# Patient Record
Sex: MALE | Race: WHITE | NOT HISPANIC OR LATINO | URBAN - METROPOLITAN AREA
[De-identification: names, ages, dates, MRNs, and addresses within clinical notes are randomized per-mention and may not be internally consistent; named-entity substitution may affect disease eponyms.]

---

## 2023-07-21 ENCOUNTER — APPOINTMENT (OUTPATIENT)
Dept: RADIOLOGY | Facility: MEDICAL CENTER | Age: 28
End: 2023-07-21
Attending: EMERGENCY MEDICINE
Payer: COMMERCIAL

## 2023-07-21 ENCOUNTER — HOSPITAL ENCOUNTER (OUTPATIENT)
Facility: MEDICAL CENTER | Age: 28
End: 2023-07-22
Attending: EMERGENCY MEDICINE | Admitting: HOSPITALIST
Payer: COMMERCIAL

## 2023-07-21 DIAGNOSIS — R07.9 CHEST PAIN, UNSPECIFIED TYPE: ICD-10-CM

## 2023-07-21 DIAGNOSIS — K40.91 UNILATERAL RECURRENT INGUINAL HERNIA WITHOUT OBSTRUCTION OR GANGRENE: ICD-10-CM

## 2023-07-21 DIAGNOSIS — R51.9 ACUTE NONINTRACTABLE HEADACHE, UNSPECIFIED HEADACHE TYPE: ICD-10-CM

## 2023-07-21 PROBLEM — E87.6 HYPOKALEMIA: Status: ACTIVE | Noted: 2023-07-21

## 2023-07-21 PROBLEM — R06.02 SHORTNESS OF BREATH: Status: ACTIVE | Noted: 2023-07-21

## 2023-07-21 PROBLEM — N50.819 TESTICULAR PAIN: Status: ACTIVE | Noted: 2023-07-21

## 2023-07-21 LAB
ALBUMIN SERPL BCP-MCNC: 4.8 G/DL (ref 3.2–4.9)
ALBUMIN/GLOB SERPL: 1.9 G/DL
ALP SERPL-CCNC: 77 U/L (ref 30–99)
ALT SERPL-CCNC: 15 U/L (ref 2–50)
AMPHET UR QL SCN: NEGATIVE
ANION GAP SERPL CALC-SCNC: 15 MMOL/L (ref 7–16)
AST SERPL-CCNC: 17 U/L (ref 12–45)
BARBITURATES UR QL SCN: NEGATIVE
BASOPHILS # BLD AUTO: 0.4 % (ref 0–1.8)
BASOPHILS # BLD: 0.03 K/UL (ref 0–0.12)
BENZODIAZ UR QL SCN: NEGATIVE
BILIRUB SERPL-MCNC: 1 MG/DL (ref 0.1–1.5)
BUN SERPL-MCNC: 10 MG/DL (ref 8–22)
BZE UR QL SCN: NEGATIVE
CALCIUM ALBUM COR SERPL-MCNC: 9.3 MG/DL (ref 8.5–10.5)
CALCIUM SERPL-MCNC: 9.9 MG/DL (ref 8.4–10.2)
CANNABINOIDS UR QL SCN: POSITIVE
CHLORIDE SERPL-SCNC: 107 MMOL/L (ref 96–112)
CO2 SERPL-SCNC: 19 MMOL/L (ref 20–33)
CREAT SERPL-MCNC: 1.08 MG/DL (ref 0.5–1.4)
D DIMER PPP IA.FEU-MCNC: <0.27 UG/ML (FEU) (ref 0–0.5)
EKG IMPRESSION: NORMAL
EOSINOPHIL # BLD AUTO: 0.07 K/UL (ref 0–0.51)
EOSINOPHIL NFR BLD: 0.9 % (ref 0–6.9)
ERYTHROCYTE [DISTWIDTH] IN BLOOD BY AUTOMATED COUNT: 38.6 FL (ref 35.9–50)
FENTANYL UR QL: NEGATIVE
GFR SERPLBLD CREATININE-BSD FMLA CKD-EPI: 96 ML/MIN/1.73 M 2
GLOBULIN SER CALC-MCNC: 2.5 G/DL (ref 1.9–3.5)
GLUCOSE SERPL-MCNC: 101 MG/DL (ref 65–99)
HCT VFR BLD AUTO: 43.7 % (ref 42–52)
HGB BLD-MCNC: 15.4 G/DL (ref 14–18)
IMM GRANULOCYTES # BLD AUTO: 0.02 K/UL (ref 0–0.11)
IMM GRANULOCYTES NFR BLD AUTO: 0.3 % (ref 0–0.9)
LYMPHOCYTES # BLD AUTO: 1.56 K/UL (ref 1–4.8)
LYMPHOCYTES NFR BLD: 20.7 % (ref 22–41)
MAGNESIUM SERPL-MCNC: 1.8 MG/DL (ref 1.5–2.5)
MCH RBC QN AUTO: 31.4 PG (ref 27–33)
MCHC RBC AUTO-ENTMCNC: 35.2 G/DL (ref 32.3–36.5)
MCV RBC AUTO: 89.2 FL (ref 81.4–97.8)
METHADONE UR QL SCN: NEGATIVE
MONOCYTES # BLD AUTO: 0.46 K/UL (ref 0–0.85)
MONOCYTES NFR BLD AUTO: 6.1 % (ref 0–13.4)
NEUTROPHILS # BLD AUTO: 5.38 K/UL (ref 1.82–7.42)
NEUTROPHILS NFR BLD: 71.6 % (ref 44–72)
NRBC # BLD AUTO: 0 K/UL
NRBC BLD-RTO: 0 /100 WBC (ref 0–0.2)
OPIATES UR QL SCN: NEGATIVE
OXYCODONE UR QL SCN: NEGATIVE
PCP UR QL SCN: NEGATIVE
PLATELET # BLD AUTO: 226 K/UL (ref 164–446)
PMV BLD AUTO: 10.3 FL (ref 9–12.9)
POTASSIUM SERPL-SCNC: 3.2 MMOL/L (ref 3.6–5.5)
PROPOXYPH UR QL SCN: NEGATIVE
PROT SERPL-MCNC: 7.3 G/DL (ref 6–8.2)
RBC # BLD AUTO: 4.9 M/UL (ref 4.7–6.1)
SODIUM SERPL-SCNC: 141 MMOL/L (ref 135–145)
T4 FREE SERPL-MCNC: 1.78 NG/DL (ref 0.93–1.7)
TROPONIN T SERPL-MCNC: <6 NG/L (ref 6–19)
TROPONIN T SERPL-MCNC: <6 NG/L (ref 6–19)
TSH SERPL DL<=0.005 MIU/L-ACNC: 1.24 UIU/ML (ref 0.38–5.33)
WBC # BLD AUTO: 7.5 K/UL (ref 4.8–10.8)

## 2023-07-21 PROCEDURE — 700117 HCHG RX CONTRAST REV CODE 255: Performed by: EMERGENCY MEDICINE

## 2023-07-21 PROCEDURE — 86658 ENTEROVIRUS ANTIBODY: CPT

## 2023-07-21 PROCEDURE — 84439 ASSAY OF FREE THYROXINE: CPT

## 2023-07-21 PROCEDURE — 80053 COMPREHEN METABOLIC PANEL: CPT

## 2023-07-21 PROCEDURE — 99223 1ST HOSP IP/OBS HIGH 75: CPT | Performed by: HOSPITALIST

## 2023-07-21 PROCEDURE — 83735 ASSAY OF MAGNESIUM: CPT

## 2023-07-21 PROCEDURE — G0378 HOSPITAL OBSERVATION PER HR: HCPCS

## 2023-07-21 PROCEDURE — 70450 CT HEAD/BRAIN W/O DYE: CPT

## 2023-07-21 PROCEDURE — 96374 THER/PROPH/DIAG INJ IV PUSH: CPT

## 2023-07-21 PROCEDURE — 84484 ASSAY OF TROPONIN QUANT: CPT | Mod: 91

## 2023-07-21 PROCEDURE — 99285 EMERGENCY DEPT VISIT HI MDM: CPT

## 2023-07-21 PROCEDURE — 71275 CT ANGIOGRAPHY CHEST: CPT

## 2023-07-21 PROCEDURE — 87581 M.PNEUMON DNA AMP PROBE: CPT

## 2023-07-21 PROCEDURE — 84443 ASSAY THYROID STIM HORMONE: CPT

## 2023-07-21 PROCEDURE — 80307 DRUG TEST PRSMV CHEM ANLYZR: CPT

## 2023-07-21 PROCEDURE — 700102 HCHG RX REV CODE 250 W/ 637 OVERRIDE(OP): Performed by: EMERGENCY MEDICINE

## 2023-07-21 PROCEDURE — A9270 NON-COVERED ITEM OR SERVICE: HCPCS | Performed by: EMERGENCY MEDICINE

## 2023-07-21 PROCEDURE — 93005 ELECTROCARDIOGRAM TRACING: CPT | Performed by: EMERGENCY MEDICINE

## 2023-07-21 PROCEDURE — 700105 HCHG RX REV CODE 258: Mod: JZ | Performed by: HOSPITALIST

## 2023-07-21 PROCEDURE — 36415 COLL VENOUS BLD VENIPUNCTURE: CPT

## 2023-07-21 PROCEDURE — 87798 DETECT AGENT NOS DNA AMP: CPT | Mod: 91

## 2023-07-21 PROCEDURE — 93005 ELECTROCARDIOGRAM TRACING: CPT

## 2023-07-21 PROCEDURE — 71045 X-RAY EXAM CHEST 1 VIEW: CPT

## 2023-07-21 PROCEDURE — 700102 HCHG RX REV CODE 250 W/ 637 OVERRIDE(OP): Performed by: HOSPITALIST

## 2023-07-21 PROCEDURE — 85379 FIBRIN DEGRADATION QUANT: CPT

## 2023-07-21 PROCEDURE — 87633 RESP VIRUS 12-25 TARGETS: CPT

## 2023-07-21 PROCEDURE — 85025 COMPLETE CBC W/AUTO DIFF WBC: CPT

## 2023-07-21 PROCEDURE — A9270 NON-COVERED ITEM OR SERVICE: HCPCS | Performed by: HOSPITALIST

## 2023-07-21 PROCEDURE — 87486 CHLMYD PNEUM DNA AMP PROBE: CPT

## 2023-07-21 PROCEDURE — 700111 HCHG RX REV CODE 636 W/ 250 OVERRIDE (IP): Mod: JZ | Performed by: EMERGENCY MEDICINE

## 2023-07-21 PROCEDURE — 76870 US EXAM SCROTUM: CPT

## 2023-07-21 PROCEDURE — 94760 N-INVAS EAR/PLS OXIMETRY 1: CPT

## 2023-07-21 RX ORDER — ACETAMINOPHEN 325 MG/1
650 TABLET ORAL EVERY 6 HOURS PRN
Status: DISCONTINUED | OUTPATIENT
Start: 2023-07-21 | End: 2023-07-22 | Stop reason: HOSPADM

## 2023-07-21 RX ORDER — KETOROLAC TROMETHAMINE 30 MG/ML
15 INJECTION, SOLUTION INTRAMUSCULAR; INTRAVENOUS ONCE
Status: DISCONTINUED | OUTPATIENT
Start: 2023-07-21 | End: 2023-07-22 | Stop reason: HOSPADM

## 2023-07-21 RX ORDER — SODIUM CHLORIDE, SODIUM LACTATE, POTASSIUM CHLORIDE, CALCIUM CHLORIDE 600; 310; 30; 20 MG/100ML; MG/100ML; MG/100ML; MG/100ML
INJECTION, SOLUTION INTRAVENOUS CONTINUOUS
Status: DISCONTINUED | OUTPATIENT
Start: 2023-07-21 | End: 2023-07-22 | Stop reason: HOSPADM

## 2023-07-21 RX ORDER — ONDANSETRON 4 MG/1
4 TABLET, ORALLY DISINTEGRATING ORAL EVERY 4 HOURS PRN
Status: DISCONTINUED | OUTPATIENT
Start: 2023-07-21 | End: 2023-07-22 | Stop reason: HOSPADM

## 2023-07-21 RX ORDER — PROMETHAZINE HYDROCHLORIDE 25 MG/1
12.5-25 TABLET ORAL EVERY 4 HOURS PRN
Status: DISCONTINUED | OUTPATIENT
Start: 2023-07-21 | End: 2023-07-22 | Stop reason: HOSPADM

## 2023-07-21 RX ORDER — OXYCODONE HYDROCHLORIDE 10 MG/1
10 TABLET ORAL
Status: DISCONTINUED | OUTPATIENT
Start: 2023-07-21 | End: 2023-07-22 | Stop reason: HOSPADM

## 2023-07-21 RX ORDER — ONDANSETRON 2 MG/ML
4 INJECTION INTRAMUSCULAR; INTRAVENOUS EVERY 4 HOURS PRN
Status: DISCONTINUED | OUTPATIENT
Start: 2023-07-21 | End: 2023-07-22 | Stop reason: HOSPADM

## 2023-07-21 RX ORDER — HYDROMORPHONE HYDROCHLORIDE 1 MG/ML
0.5 INJECTION, SOLUTION INTRAMUSCULAR; INTRAVENOUS; SUBCUTANEOUS
Status: DISCONTINUED | OUTPATIENT
Start: 2023-07-21 | End: 2023-07-22 | Stop reason: HOSPADM

## 2023-07-21 RX ORDER — ONDANSETRON 2 MG/ML
4 INJECTION INTRAMUSCULAR; INTRAVENOUS ONCE
Status: COMPLETED | OUTPATIENT
Start: 2023-07-21 | End: 2023-07-21

## 2023-07-21 RX ORDER — AMOXICILLIN 250 MG
2 CAPSULE ORAL 2 TIMES DAILY
Status: DISCONTINUED | OUTPATIENT
Start: 2023-07-21 | End: 2023-07-22 | Stop reason: HOSPADM

## 2023-07-21 RX ORDER — ACETAMINOPHEN 325 MG/1
650 TABLET ORAL ONCE
Status: COMPLETED | OUTPATIENT
Start: 2023-07-21 | End: 2023-07-21

## 2023-07-21 RX ORDER — POLYETHYLENE GLYCOL 3350 17 G/17G
1 POWDER, FOR SOLUTION ORAL
Status: DISCONTINUED | OUTPATIENT
Start: 2023-07-21 | End: 2023-07-22 | Stop reason: HOSPADM

## 2023-07-21 RX ORDER — PROCHLORPERAZINE EDISYLATE 5 MG/ML
5-10 INJECTION INTRAMUSCULAR; INTRAVENOUS EVERY 4 HOURS PRN
Status: DISCONTINUED | OUTPATIENT
Start: 2023-07-21 | End: 2023-07-22 | Stop reason: HOSPADM

## 2023-07-21 RX ORDER — BISACODYL 10 MG
10 SUPPOSITORY, RECTAL RECTAL
Status: DISCONTINUED | OUTPATIENT
Start: 2023-07-21 | End: 2023-07-22 | Stop reason: HOSPADM

## 2023-07-21 RX ORDER — PROMETHAZINE HYDROCHLORIDE 25 MG/1
12.5-25 SUPPOSITORY RECTAL EVERY 4 HOURS PRN
Status: DISCONTINUED | OUTPATIENT
Start: 2023-07-21 | End: 2023-07-22 | Stop reason: HOSPADM

## 2023-07-21 RX ORDER — OXYCODONE HYDROCHLORIDE 5 MG/1
5 TABLET ORAL
Status: DISCONTINUED | OUTPATIENT
Start: 2023-07-21 | End: 2023-07-22 | Stop reason: HOSPADM

## 2023-07-21 RX ADMIN — IOHEXOL 100 ML: 350 INJECTION, SOLUTION INTRAVENOUS at 18:00

## 2023-07-21 RX ADMIN — SODIUM CHLORIDE, POTASSIUM CHLORIDE, SODIUM LACTATE AND CALCIUM CHLORIDE: 600; 310; 30; 20 INJECTION, SOLUTION INTRAVENOUS at 21:21

## 2023-07-21 RX ADMIN — RIVAROXABAN 10 MG: 10 TABLET, FILM COATED ORAL at 21:04

## 2023-07-21 RX ADMIN — ACETAMINOPHEN 650 MG: 325 TABLET ORAL at 16:59

## 2023-07-21 RX ADMIN — OXYCODONE 5 MG: 5 TABLET ORAL at 22:04

## 2023-07-21 RX ADMIN — ONDANSETRON 4 MG: 2 INJECTION INTRAMUSCULAR; INTRAVENOUS at 17:05

## 2023-07-21 ASSESSMENT — COGNITIVE AND FUNCTIONAL STATUS - GENERAL
SUGGESTED CMS G CODE MODIFIER DAILY ACTIVITY: CH
MOBILITY SCORE: 24
DAILY ACTIVITIY SCORE: 24
SUGGESTED CMS G CODE MODIFIER MOBILITY: CH

## 2023-07-21 ASSESSMENT — HEART SCORE
HEART SCORE: 3
RISK FACTORS: 1-2 RISK FACTORS
HISTORY: MODERATELY SUSPICIOUS
AGE: <45
TROPONIN: LESS THAN OR EQUAL TO NORMAL LIMIT
ECG: NON-SPECIFIC REPOLARIZATION DISTURBANCE

## 2023-07-21 ASSESSMENT — LIFESTYLE VARIABLES
EVER HAD A DRINK FIRST THING IN THE MORNING TO STEADY YOUR NERVES TO GET RID OF A HANGOVER: NO
TOTAL SCORE: 0
HOW MANY TIMES IN THE PAST YEAR HAVE YOU HAD 5 OR MORE DRINKS IN A DAY: 0
HAVE PEOPLE ANNOYED YOU BY CRITICIZING YOUR DRINKING: NO
AVERAGE NUMBER OF DAYS PER WEEK YOU HAVE A DRINK CONTAINING ALCOHOL: 2
TOTAL SCORE: 0
ALCOHOL_USE: YES
TOTAL SCORE: 0
CONSUMPTION TOTAL: NEGATIVE
EVER FELT BAD OR GUILTY ABOUT YOUR DRINKING: NO
HAVE YOU EVER FELT YOU SHOULD CUT DOWN ON YOUR DRINKING: NO
ON A TYPICAL DAY WHEN YOU DRINK ALCOHOL HOW MANY DRINKS DO YOU HAVE: 2

## 2023-07-21 ASSESSMENT — ENCOUNTER SYMPTOMS
FOCAL WEAKNESS: 0
FEVER: 0
STRIDOR: 0
SHORTNESS OF BREATH: 1
NERVOUS/ANXIOUS: 0
BRUISES/BLEEDS EASILY: 0
ABDOMINAL PAIN: 0
EYE DISCHARGE: 0
MYALGIAS: 0
FLANK PAIN: 0
EYE REDNESS: 0
COUGH: 0
VOMITING: 0
CHILLS: 1

## 2023-07-21 ASSESSMENT — PATIENT HEALTH QUESTIONNAIRE - PHQ9
1. LITTLE INTEREST OR PLEASURE IN DOING THINGS: NOT AT ALL
2. FEELING DOWN, DEPRESSED, IRRITABLE, OR HOPELESS: NOT AT ALL
SUM OF ALL RESPONSES TO PHQ9 QUESTIONS 1 AND 2: 0

## 2023-07-21 ASSESSMENT — PAIN DESCRIPTION - PAIN TYPE
TYPE: ACUTE PAIN
TYPE: ACUTE PAIN

## 2023-07-21 ASSESSMENT — FIBROSIS 4 INDEX: FIB4 SCORE: 0.52

## 2023-07-21 NOTE — ED PROVIDER NOTES
"ED Provider Note    CHIEF COMPLAINT  Chief Complaint   Patient presents with    Chest Pain     Arrives anxious, hyperventilating and complaining of chest pain. With a friend who states this pt had a recent DX of a hernia. Taken for an EKG in triage.        EXTERNAL RECORDS REVIEWED  Other no pertinent records for review    HPI/ROS  LIMITATION TO HISTORY   Select: : None  OUTSIDE HISTORIAN(S):  Friend at bedside but does not provide any additional history    Amilcar Grove Jr. is a 28 y.o. male who presents to the ER with complaint of an exacerbation of chest pain, headache and testicle pain which has been going on for the last 2 weeks.  Patient states that he has had testicle discomfort for the last month and a half.  2 weeks ago he developed increased testicle pain in conjunction with chest pain and a headache and went to the ER in [place] and was diagnosed with a right inguinal hernia.  He states that over the last 2 weeks he had multiple episodes of an increase in testicle pain, chest pain and headache which seems to last anywhere from 5 to 10 minutes at a time.  Symptoms will then resolve for about 8 or 9 hours and then recur.  Patient feels short of breath when these episodes occur.  The patient said that he got in the car in [place] around 9:30 in the morning.  He ate a marijuana edible.  Around 10:30 in the morning on their drive towards [place], he had a sudden increase in chest pain, headache and testicle pain which was much more severe than any other exacerbation that he has had over the last few weeks.  Patient states \"I felt like I was going to die.\"  Patient said he felt very short of breath with this.  The pain radiated into his bilateral elbows and knees.  He said that the pain also radiated into his right jaw and into his back.  Here in the ER the patient says he feels a little better although he still has a mild headache and still feels an ache in his chest.  He describes the chest pain " "as \"a balloon inflating within his chest.\"  Patient is not on any medications for any medical problems.  With respect to the testicle pain, he says the pain in his testicles the same pain he has had for the last month and a half.  Nothing new or different.  It is the chest pain and headache that was quite a bit worse than normal today and it is the chest pain and headache which brought him to the ER this afternoon.    PAST MEDICAL HISTORY   has a past medical history of Biliary dyskinesia.    SURGICAL HISTORY   has a past surgical history that includes tonsillectomy (11/2/2011) and connie by laparoscopy (12/17/2012).    FAMILY HISTORY  No family history on file.    SOCIAL HISTORY  Social History     Tobacco Use    Smoking status: Never    Smokeless tobacco: Not on file   Substance and Sexual Activity    Alcohol use: No    Drug use: No    Sexual activity: Not on file       CURRENT MEDICATIONS  Home Medications       Reviewed by Kalpesh Ibarra (Pharmacy Tech) on 07/21/23 at 1702  Med List Status: Complete     Medication Last Dose Status   ASHWAGANDHA PO 7/20/2023 Active   Cholecalciferol (VITAMIN D-3) 125 MCG (5000 UT) Tab 7/21/2023 Active   Ginkgo Biloba (GINKGO PO) 7/20/2023 Active   Menaquinone-7 (VITAMIN K2 PO) 7/20/2023 Active   Non Formulary Request 7/21/2023 Active   Omega-3 Fatty Acids (FISH OIL PO) 7/20/2023 Active                    ALLERGIES  No Known Allergies    PHYSICAL EXAM  VITAL SIGNS: /65   Pulse 62   Temp 36.8 °C (98.3 °F) (Temporal)   Resp 15   Ht 1.854 m (6' 1\")   Wt 81.6 kg (180 lb)   SpO2 95%   BMI 23.75 kg/m²    Constitutional:  Well developed, well nourished; anxious.  Slightly pale in color.  HENT: Normocephalic, Atraumatic, Bilateral external ears normal, oropharyngeal examination deferred due to COVID-19 break and lack of oropharyngeal complaint.  Eyes: PERRL, EOMI, Conjunctiva normal, No discharge.   Neck: Normal range of motion, supple, nontender  Lymphatic: No " lymphadenopathy noted.   Cardiovascular: Normal heart rate, Normal rhythm, No murmurs, rubs or gallops   Thorax & Lungs: CTA=bilaterally;  No respiratory distress,  No wheezing rales, or rhonchi; No chest tenderness. No crepitus or subQ air  Abdomen: Soft, good bowel sounds,  No guarding no rebound, no masses, no pulsatile mass, no tenderness, no distention  : Mild testicle tenderness on the left.  No obvious large hernias on examination of the inguinal canal.  Testicles are nontender.  No scrotal thickening or redness.  Skin: Warm, Dry, No erythema, No rash.   Back: No tenderness, No CVA tenderness.   Extremities: 2+ dp and pt pulses bilateral LEs;  Nontender; no pretibial edema  Neurologic: Alert & oriented x 4, clear speech  Psychiatric: appropriate, normal affect     DIAGNOSTIC STUDIES / PROCEDURES  EKG  I have independently interpreted this EKG  Please see my EKG interpretation below    LABS  Results for orders placed or performed during the hospital encounter of 07/21/23   CBC WITH DIFFERENTIAL   Result Value Ref Range    WBC 7.5 4.8 - 10.8 K/uL    RBC 4.90 4.70 - 6.10 M/uL    Hemoglobin 15.4 14.0 - 18.0 g/dL    Hematocrit 43.7 42.0 - 52.0 %    MCV 89.2 81.4 - 97.8 fL    MCH 31.4 27.0 - 33.0 pg    MCHC 35.2 32.3 - 36.5 g/dL    RDW 38.6 35.9 - 50.0 fL    Platelet Count 226 164 - 446 K/uL    MPV 10.3 9.0 - 12.9 fL    Neutrophils-Polys 71.60 44.00 - 72.00 %    Lymphocytes 20.70 (L) 22.00 - 41.00 %    Monocytes 6.10 0.00 - 13.40 %    Eosinophils 0.90 0.00 - 6.90 %    Basophils 0.40 0.00 - 1.80 %    Immature Granulocytes 0.30 0.00 - 0.90 %    Nucleated RBC 0.00 0.00 - 0.20 /100 WBC    Neutrophils (Absolute) 5.38 1.82 - 7.42 K/uL    Lymphs (Absolute) 1.56 1.00 - 4.80 K/uL    Monos (Absolute) 0.46 0.00 - 0.85 K/uL    Eos (Absolute) 0.07 0.00 - 0.51 K/uL    Baso (Absolute) 0.03 0.00 - 0.12 K/uL    Immature Granulocytes (abs) 0.02 0.00 - 0.11 K/uL    NRBC (Absolute) 0.00 K/uL   TROPONIN   Result Value Ref Range     Troponin T <6 6 - 19 ng/L   Comp Metabolic Panel   Result Value Ref Range    Sodium 141 135 - 145 mmol/L    Potassium 3.2 (L) 3.6 - 5.5 mmol/L    Chloride 107 96 - 112 mmol/L    Co2 19 (L) 20 - 33 mmol/L    Anion Gap 15.0 7.0 - 16.0    Glucose 101 (H) 65 - 99 mg/dL    Bun 10 8 - 22 mg/dL    Creatinine 1.08 0.50 - 1.40 mg/dL    Calcium 9.9 8.4 - 10.2 mg/dL    Correct Calcium 9.3 8.5 - 10.5 mg/dL    AST(SGOT) 17 12 - 45 U/L    ALT(SGPT) 15 2 - 50 U/L    Alkaline Phosphatase 77 30 - 99 U/L    Total Bilirubin 1.0 0.1 - 1.5 mg/dL    Albumin 4.8 3.2 - 4.9 g/dL    Total Protein 7.3 6.0 - 8.2 g/dL    Globulin 2.5 1.9 - 3.5 g/dL    A-G Ratio 1.9 g/dL   ESTIMATED GFR   Result Value Ref Range    GFR (CKD-EPI) 96 >60 mL/min/1.73 m 2   TSH   Result Value Ref Range    TSH 1.240 0.380 - 5.330 uIU/mL   D-DIMER   Result Value Ref Range    D-Dimer <0.27 0.00 - 0.50 ug/mL (FEU)   URINE DRUG SCREEN   Result Value Ref Range    Amphetamines Urine Negative Negative    Barbiturates Negative Negative    Benzodiazepines Negative Negative    Cocaine Metabolite Negative Negative    Fentanyl, Urine Negative Negative    Methadone Negative Negative    Opiates Negative Negative    Oxycodone Negative Negative    Phencyclidine -Pcp Negative Negative    Propoxyphene Negative Negative    Cannabinoid Metab Positive (A) Negative   FREE THYROXINE   Result Value Ref Range    Free T-4 1.78 (H) 0.93 - 1.70 ng/dL   MAGNESIUM   Result Value Ref Range    Magnesium 1.8 1.5 - 2.5 mg/dL   EKG   Result Value Ref Range    Report       [facility] Emergency Dept.    Test Date:  2023  Pt Name:    TORSTEN ABBOTT               Department: [facility]  MRN:        [id number]                      Room:  Gender:     Male                         Technician: [id number] MICHELLE  :        1995                   Requested By:ER TRIAGE PROTOCOL  Order #:    [id number]                    Rodney MD: Gauri [place]    Measurements  Intervals                                 Axis  Rate:       110                          P:          72  KY:         150                          QRS:        104  QRSD:       113                          T:          49  QT:         339  QTc:        459    Interpretive Statements  Sinus tachycardia rate 110  Normal axis  Minimal ST depression II, III, AVF  No ST elevation  Borderline intraventricular conduction delay  Baseline wander in lead(s) V1,V2  No previous ECG available for comparison  Electronically Signed On 07- 14:40:39 PDT by Gauri [place]          RADIOLOGY  I have independently interpreted the diagnostic imaging associated with this visit and am waiting the final reading from the radiologist.     My preliminary interpretation is as follows: Chest x-ray was reviewed by ER MD.  No mediastinal widening.  No obvious infiltrates.    Radiologist interpretation:   CT-CTA COMPLETE THORACOABDOMINAL AORTA   Final Result      1.  No aortic aneurysm or dissection identified.   2.  No acute abnormality of the chest, abdomen or pelvis.   3.  Status post cholecystectomy.   4.  Evidence of previous left varicocele embolization.                        CT-HEAD W/O   Final Result      No CT evidence of acute infarct, hemorrhage or mass.         [id number]   Final Result      1.  No evidence of testicular mass or torsion.      2.  Small right inguinal hernia.      DX-CHEST-PORTABLE (1 VIEW)   Final Result      No evidence of acute cardiopulmonary process.           COURSE & MEDICAL DECISION MAKING    ED Observation Status? Yes; I am placing the patient in to an observation status due to a diagnostic uncertainty as well as therapeutic intensity. Patient placed in observation status at 3:18 PM, 7/21/2023.     Observation plan is as follows: Patient was placed in ED observation status as he will need comprehensive work-up and evaluation for his complaint of headache, chest pain and testicle pain.    Upon Reevaluation, the patient's condition has:  "not improved; and will be escalated to hospitalization.    Patient discharged from ED Observation status at 1850 (Time) July 21, 2023  (Date).     INITIAL ASSESSMENT, COURSE AND PLAN  Care Narrative: Patient presents to the ER complaining of chest pain with radiation into the back, pain to the right jaw, and bilateral elbows, down in the bilateral knees as well as a headache and testicular pain which became markedly more severe around 1030 this morning while he was driving west from [place].  Patient and his significant other were heading towards [place].  The patient says of last 2 weeks he had intermittent episodes of chest pain, headache and shortness of breath as well as testicle pain, although the testicle pain has been going on for about a month and a half and seems to be fairly stable.  He describes the chest pain as a feeling as though a balloon is expanding in his chest.  He gets short of breath.  When he presented here to the ER he was tachycardic in the 130s.  He was hyperventilating.  He told me that the pain was so bad today that he \"thought he was going to die.\"  EKG shows some mild ST depression in the inferior leads as well as some CT depression.  No ST elevation.  Troponin is normal.  Based upon EKG, I am concerned about pericarditis.  Dimer is normal.  No evidence of PE.  Given patient's complaint of chest pain rating to the back and the neck as well as bilateral elbows and his knees and the testicle pain, is worried about an aortic dissection.  For this reason a thoracoabdominal aortic CT scan was done.  It is negative for any acute dissection.  Patient has He is feeling better now here that he is here in the ER.  He was given some Toradol for what might be pericarditis.  At this time I think he needs to be hospitalized for echocardiogram already had his gallbladder taken out.  For further evaluation and rule out for pericarditis.  I spoke with the hospitalist on-call and he will kindly " evaluate the patient hospitalization.    1850: Discussed with Dr. Vidal, hospitalist on-call.  He will kindly evaluate the patient for hospitalization.    HTN/IDDM FOLLOW UP:  The patient is referred to a primary physician for blood pressure management, diabetic screening, and for all other preventive health concerns      ADDITIONAL PROBLEM LIST  Problem #1: Chest pain   problem #2: Headache  Problem #3: Testicle pain with known right inguinal hernia    DISPOSITION AND DISCUSSIONS  I have discussed management of the patient with the following physicians and JANETH's:      Discussion of management with other Q or appropriate source(s): None     Escalation of care considered, and ultimately not performed: Patient does not have any fever.  No stiff neck.  Headaches have been coming and going for 2 weeks.  No need for lumbar puncture.    Barriers to care at this time, including but not limited to:  Patient is from [place] .     Decision tools and prescription drugs considered including, but not limited to: HEART Score 3 .    FINAL DIAGNOSIS  1. Chest pain, unspecified type Acute   2. Acute nonintractable headache, unspecified headache type Acute   3. Unilateral recurrent inguinal hernia without obstruction or gangrene Acute        This dictation has been created using voice recognition software. The accuracy of the dictation is limited by the abilities of the software. I expect there may be some errors of grammar and possibly content. I made every attempt to manually correct the errors within my dictation. However, errors related to voice recognition software may still exist and should be interpreted within the appropriate context.     Electronically signed by: Gauri [place] M.D., 7/21/2023 2:21 PM

## 2023-07-22 ENCOUNTER — APPOINTMENT (OUTPATIENT)
Dept: CARDIOLOGY | Facility: MEDICAL CENTER | Age: 28
End: 2023-07-22
Attending: INTERNAL MEDICINE
Payer: COMMERCIAL

## 2023-07-22 VITALS
RESPIRATION RATE: 18 BRPM | TEMPERATURE: 98.4 F | HEART RATE: 78 BPM | WEIGHT: 182.1 LBS | BODY MASS INDEX: 24.13 KG/M2 | OXYGEN SATURATION: 99 % | HEIGHT: 73 IN | SYSTOLIC BLOOD PRESSURE: 115 MMHG | DIASTOLIC BLOOD PRESSURE: 76 MMHG

## 2023-07-22 LAB
ALBUMIN SERPL BCP-MCNC: 4.3 G/DL (ref 3.2–4.9)
ALBUMIN/GLOB SERPL: 1.9 G/DL
ALP SERPL-CCNC: 67 U/L (ref 30–99)
ALT SERPL-CCNC: 12 U/L (ref 2–50)
ANION GAP SERPL CALC-SCNC: 10 MMOL/L (ref 7–16)
AST SERPL-CCNC: 13 U/L (ref 12–45)
B PARAP IS1001 DNA NPH QL NAA+NON-PROBE: NOT DETECTED
B PERT.PT PRMT NPH QL NAA+NON-PROBE: NOT DETECTED
BILIRUB SERPL-MCNC: 0.9 MG/DL (ref 0.1–1.5)
BUN SERPL-MCNC: 9 MG/DL (ref 8–22)
C PNEUM DNA NPH QL NAA+NON-PROBE: NOT DETECTED
CALCIUM ALBUM COR SERPL-MCNC: 9 MG/DL (ref 8.5–10.5)
CALCIUM SERPL-MCNC: 9.2 MG/DL (ref 8.4–10.2)
CHLORIDE SERPL-SCNC: 107 MMOL/L (ref 96–112)
CHOLEST SERPL-MCNC: 109 MG/DL (ref 100–199)
CO2 SERPL-SCNC: 24 MMOL/L (ref 20–33)
CREAT SERPL-MCNC: 0.93 MG/DL (ref 0.5–1.4)
EKG IMPRESSION: NORMAL
ERYTHROCYTE [DISTWIDTH] IN BLOOD BY AUTOMATED COUNT: 42 FL (ref 35.9–50)
FLUAV RNA NPH QL NAA+NON-PROBE: NOT DETECTED
FLUBV RNA NPH QL NAA+NON-PROBE: NOT DETECTED
GFR SERPLBLD CREATININE-BSD FMLA CKD-EPI: 115 ML/MIN/1.73 M 2
GLOBULIN SER CALC-MCNC: 2.3 G/DL (ref 1.9–3.5)
GLUCOSE SERPL-MCNC: 86 MG/DL (ref 65–99)
HADV DNA NPH QL NAA+NON-PROBE: NOT DETECTED
HCOV 229E RNA NPH QL NAA+NON-PROBE: NOT DETECTED
HCOV HKU1 RNA NPH QL NAA+NON-PROBE: NOT DETECTED
HCOV NL63 RNA NPH QL NAA+NON-PROBE: NOT DETECTED
HCOV OC43 RNA NPH QL NAA+NON-PROBE: NOT DETECTED
HCT VFR BLD AUTO: 40.7 % (ref 42–52)
HDLC SERPL-MCNC: 49 MG/DL
HGB BLD-MCNC: 14.2 G/DL (ref 14–18)
HMPV RNA NPH QL NAA+NON-PROBE: NOT DETECTED
HPIV1 RNA NPH QL NAA+NON-PROBE: NOT DETECTED
HPIV2 RNA NPH QL NAA+NON-PROBE: NOT DETECTED
HPIV3 RNA NPH QL NAA+NON-PROBE: NOT DETECTED
HPIV4 RNA NPH QL NAA+NON-PROBE: NOT DETECTED
LDLC SERPL CALC-MCNC: 54 MG/DL
LV EJECT FRACT  99904: 65
LV EJECT FRACT MOD 2C 99903: 66.54
LV EJECT FRACT MOD 4C 99902: 67.28
LV EJECT FRACT MOD BP 99901: 67.28
M PNEUMO DNA NPH QL NAA+NON-PROBE: NOT DETECTED
MAGNESIUM SERPL-MCNC: 2.1 MG/DL (ref 1.5–2.5)
MCH RBC QN AUTO: 32.1 PG (ref 27–33)
MCHC RBC AUTO-ENTMCNC: 34.9 G/DL (ref 32.3–36.5)
MCV RBC AUTO: 91.9 FL (ref 81.4–97.8)
PLATELET # BLD AUTO: 204 K/UL (ref 164–446)
PMV BLD AUTO: 10.6 FL (ref 9–12.9)
POTASSIUM SERPL-SCNC: 4.1 MMOL/L (ref 3.6–5.5)
PROT SERPL-MCNC: 6.6 G/DL (ref 6–8.2)
RBC # BLD AUTO: 4.43 M/UL (ref 4.7–6.1)
RSV RNA NPH QL NAA+NON-PROBE: NOT DETECTED
RV+EV RNA NPH QL NAA+NON-PROBE: NOT DETECTED
SARS-COV-2 RNA NPH QL NAA+NON-PROBE: NOTDETECTED
SODIUM SERPL-SCNC: 141 MMOL/L (ref 135–145)
TRIGL SERPL-MCNC: 29 MG/DL (ref 0–149)
WBC # BLD AUTO: 6.5 K/UL (ref 4.8–10.8)

## 2023-07-22 PROCEDURE — 99239 HOSP IP/OBS DSCHRG MGMT >30: CPT | Performed by: INTERNAL MEDICINE

## 2023-07-22 PROCEDURE — 83735 ASSAY OF MAGNESIUM: CPT

## 2023-07-22 PROCEDURE — G0378 HOSPITAL OBSERVATION PER HR: HCPCS

## 2023-07-22 PROCEDURE — 93005 ELECTROCARDIOGRAM TRACING: CPT | Performed by: INTERNAL MEDICINE

## 2023-07-22 PROCEDURE — 36415 COLL VENOUS BLD VENIPUNCTURE: CPT

## 2023-07-22 PROCEDURE — A9270 NON-COVERED ITEM OR SERVICE: HCPCS | Performed by: HOSPITALIST

## 2023-07-22 PROCEDURE — 94760 N-INVAS EAR/PLS OXIMETRY 1: CPT

## 2023-07-22 PROCEDURE — 80061 LIPID PANEL: CPT

## 2023-07-22 PROCEDURE — 93306 TTE W/DOPPLER COMPLETE: CPT | Mod: 26 | Performed by: INTERNAL MEDICINE

## 2023-07-22 PROCEDURE — 93306 TTE W/DOPPLER COMPLETE: CPT

## 2023-07-22 PROCEDURE — 85027 COMPLETE CBC AUTOMATED: CPT

## 2023-07-22 PROCEDURE — 80053 COMPREHEN METABOLIC PANEL: CPT

## 2023-07-22 PROCEDURE — 93010 ELECTROCARDIOGRAM REPORT: CPT | Performed by: INTERNAL MEDICINE

## 2023-07-22 PROCEDURE — 700102 HCHG RX REV CODE 250 W/ 637 OVERRIDE(OP): Performed by: HOSPITALIST

## 2023-07-22 RX ADMIN — SENNOSIDES AND DOCUSATE SODIUM 2 TABLET: 50; 8.6 TABLET ORAL at 05:22

## 2023-07-22 ASSESSMENT — PATIENT HEALTH QUESTIONNAIRE - PHQ9
1. LITTLE INTEREST OR PLEASURE IN DOING THINGS: NOT AT ALL
SUM OF ALL RESPONSES TO PHQ9 QUESTIONS 1 AND 2: 0
2. FEELING DOWN, DEPRESSED, IRRITABLE, OR HOPELESS: NOT AT ALL

## 2023-07-22 ASSESSMENT — PAIN DESCRIPTION - PAIN TYPE: TYPE: ACUTE PAIN

## 2023-07-22 NOTE — PROGRESS NOTES
Telemetry Shift Summary     Rhythm: SR/SB  HR: 45-71  Ectopy: -  Measurements: 0.14/0.10/0.46       Normal Values  Rhythm: SR  HR:   Measurements: 0.12-0.20 / 0.04-0.10 / 0.30-0.52

## 2023-07-22 NOTE — ED NOTES
Pharmacy Medication Reconciliation      ~Medication reconciliation updated and complete per patient at bedside   ~Allergies have been verified   ~No oral ABX within the last 30 days  ~Patient home pharmacy :  [facility]

## 2023-07-22 NOTE — PROGRESS NOTES
4 Eyes Skin Assessment Completed by Radha RN and Crystal RN.    Head WDL  Ears WDL  Nose WDL  Mouth WDL  Neck WDL  Breast/Chest WDL  Shoulder Blades WDL  Spine WDL  (R) Arm/Elbow/Hand Scar  (L) Arm/Elbow/Hand WDL  Abdomen WDL  Groin WDL  Scrotum/Coccyx/Buttocks WDL  (R) Leg WDL  (L) Leg Scar  (R) Heel/Foot/Toe WDL  (L) Heel/Foot/Toe WDL          Devices In Places Tele Box      Interventions In Place N/A    Possible Skin Injury No    Pictures Uploaded Into Epic N/A  Wound Consult Placed N/A  RN Wound Prevention Protocol Ordered No

## 2023-07-22 NOTE — PROGRESS NOTES
On call provider notified of pt heart rate down to 43 and sustaining < 50. Provider would like to be notified if pt is symptomatic.

## 2023-07-22 NOTE — DISCHARGE INSTRUCTIONS
Chest Pain Observation  It is often hard to give a specific diagnosis for the cause of chest pain. Among other possibilities your symptoms might be caused by inadequate oxygen delivery to your heart (angina). Angina that is not treated or evaluated can lead to a heart attack (myocardial infarction) or death.  Blood tests, electrocardiograms, and X-rays may have been done to help determine a possible cause of your chest pain. After evaluation and observation, your health care provider has determined that it is unlikely your pain was caused by an unstable condition that requires hospitalization. However, a full evaluation of your pain may need to be completed, with additional diagnostic testing as directed. It is very important to keep your follow-up appointments. Not keeping your follow-up appointments could result in permanent heart damage, disability, or death. If there is any problem keeping your follow-up appointments, you must call your health care provider.  HOME CARE INSTRUCTIONS   Due to the slight chance that your pain could be angina, it is important to follow your health care provider's treatment plan and also maintain a healthy lifestyle:  Maintain or work toward achieving a healthy weight.  Stay physically active and exercise regularly.  Decrease your salt intake.  Eat a balanced, healthy diet. Talk to a dietitian to learn about heart-healthy foods.  Increase your fiber intake by including whole grains, vegetables, fruits, and nuts in your diet.  Avoid situations that cause stress, anger, or depression.  Take medicines as advised by your health care provider. Report any side effects to your health care provider. Do not stop medicines or adjust the dosages on your own.  Quit smoking. Do not use nicotine patches or gum until you check with your health care provider.  Keep your blood pressure, blood sugar, and cholesterol levels within normal limits.  Limit alcohol intake to no more than 1 drink per day for  women who are not pregnant and 2 drinks per day for men.  Do not abuse drugs.  SEEK IMMEDIATE MEDICAL CARE IF:  You have severe chest pain or pressure which may include symptoms such as:  You feel pain or pressure in your arms, neck, jaw, or back.  You have severe back or abdominal pain, feel sick to your stomach (nauseous), or throw up (vomit).  You are sweating profusely.  You are having a fast or irregular heartbeat.  You feel short of breath while at rest.  You notice increasing shortness of breath during rest, sleep, or with activity.  You have chest pain that does not get better after rest or after taking your usual medicine.  You wake from sleep with chest pain.  You are unable to sleep because you cannot breathe.  You develop a frequent cough or you are coughing up blood.  You feel dizzy, faint, or experience extreme fatigue.  You develop severe weakness, dizziness, fainting, or chills.  Any of these symptoms may represent a serious problem that is an emergency. Do not wait to see if the symptoms will go away. Call your local emergency services (911 in the [place] Do not drive yourself to the hospital.  MAKE SURE YOU:  Understand these instructions.  Will watch your condition.  Will get help right away if you are not doing well or get worse.     This information is not intended to replace advice given to you by your health care provider. Make sure you discuss any questions you have with your health care provider.       Document Released: 01/20/2012 Document Revised: 12/23/2014 Document Reviewed: 06/19/2014  [employer] Patient Education ©2016 [employer]

## 2023-07-22 NOTE — H&P
Hospital Medicine History & Physical Note    Date of Service  7/21/2023    Primary Care Physician  Carmine Perry M.D.    Consultants  None     Code Status  Full Code    Chief Complaint  Chief Complaint   Patient presents with    Chest Pain     Arrives anxious, hyperventilating and complaining of chest pain. With a friend who states this pt had a recent DX of a hernia. Taken for an EKG in triage.      History of Presenting Illness  Amilcar Grove Jr. is a 28 y.o. male who presented 7/21/2023 with worsening generalized weakness chest pain and shortness of breath.  Patient reports that he has not been feeling well over the past 2 weeks.  He has been having intermittent episodes of shortness of breath, palpitation, generalized weakness.  In addition he has been having some testicular pain and headache.      I discussed the plan of care with emergency department physician, the patient.    Review of Systems  Review of Systems   Constitutional:  Positive for chills and malaise/fatigue. Negative for fever.   Eyes:  Negative for discharge and redness.   Respiratory:  Positive for shortness of breath. Negative for cough and stridor.    Cardiovascular:  Positive for chest pain. Negative for leg swelling.   Gastrointestinal:  Negative for abdominal pain and vomiting.   Genitourinary:  Negative for flank pain.   Musculoskeletal:  Negative for myalgias.   Skin: Negative.    Neurological:  Negative for focal weakness.   Endo/Heme/Allergies:  Does not bruise/bleed easily.   Psychiatric/Behavioral:  The patient is not nervous/anxious.      Past Medical History   has a past medical history of Biliary dyskinesia.    Surgical History   has a past surgical history that includes tonsillectomy (11/2/2011) and connie by laparoscopy (12/17/2012).     Family History  family history is not on file.      Social History   reports that he has never smoked. He does not have any smokeless tobacco history on file. He reports that he does  not drink alcohol and does not use drugs.    Allergies  No Known Allergies    Medications  Prior to Admission Medications   Prescriptions Last Dose Informant Patient Reported? Taking?   ASHWAGANDHA PO 7/20/2023 at AM Patient Yes Yes   Sig: Take 1 Capsule by mouth every morning.   Cholecalciferol (VITAMIN D-3) 125 MCG (5000 UT) Tab 7/21/2023 at 0800 Patient Yes Yes   Sig: Take 5,000 Units by mouth every morning.   Ginkgo Biloba (GINKGO PO) 7/20/2023 at AM Patient Yes Yes   Sig: Take 2 Capsules by mouth every morning.   Menaquinone-7 (VITAMIN K2 PO) 7/20/2023 at AM Patient Yes Yes   Sig: Take 1 Tablet by mouth every morning.   Non Formulary Request 7/21/2023 at 0800 Patient Yes Yes   Sig: Take 2 Tablets by mouth every morning. Alpha Brain Supplement   Omega-3 Fatty Acids (FISH OIL PO) 7/20/2023 at AM Patient Yes Yes   Sig: Take 1 Capful by mouth every morning.      Facility-Administered Medications: None     Physical Exam  Temp:  [36.8 °C (98.3 °F)] 36.8 °C (98.3 °F)  Pulse:  [] 62  Resp:  [11-26] 15  BP: (113-145)/(65-82) 127/65  SpO2:  [95 %-100 %] 95 %  Blood Pressure: 127/65   Temperature: 36.8 °C (98.3 °F)   Pulse: 62   Respiration: 15   Pulse Oximetry: 95 %     Physical Exam  Constitutional:       General: He is not in acute distress.     Appearance: He is not ill-appearing or diaphoretic.   HENT:      Head: Atraumatic.      Right Ear: External ear normal.      Left Ear: External ear normal.      Nose: No congestion or rhinorrhea.      Mouth/Throat:      Mouth: Mucous membranes are dry.   Eyes:      General: No scleral icterus.        Right eye: No discharge.         Left eye: No discharge.      Pupils: Pupils are equal, round, and reactive to light.   Cardiovascular:      Rate and Rhythm: Regular rhythm. Tachycardia present.   Pulmonary:      Comments: Saturating well on room air  Abdominal:      General: There is no distension.   Musculoskeletal:      Cervical back: Neck supple. No rigidity. No muscular  tenderness.      Right lower leg: No edema.      Left lower leg: No edema.   Skin:     General: Skin is dry.      Coloration: Skin is not jaundiced or pale.   Neurological:      Mental Status: He is alert and oriented to person, place, and time.      Coordination: Coordination normal.   Psychiatric:         Mood and Affect: Mood normal.         Behavior: Behavior normal.       Laboratory:  Recent Labs     07/21/23  1302   WBC 7.5   RBC 4.90   HEMOGLOBIN 15.4   HEMATOCRIT 43.7   MCV 89.2   MCH 31.4   MCHC 35.2   RDW 38.6   PLATELETCT 226   MPV 10.3     Recent Labs     07/21/23  1302   SODIUM 141   POTASSIUM 3.2*   CHLORIDE 107   CO2 19*   GLUCOSE 101*   BUN 10   CREATININE 1.08   CALCIUM 9.9     Recent Labs     07/21/23  1302   ALTSGPT 15   ASTSGOT 17   ALKPHOSPHAT 77   TBILIRUBIN 1.0   GLUCOSE 101*         No results for input(s): NTPROBNP in the last 72 hours.      Recent Labs     07/21/23  1302   TROPONINT <6     Imaging:  CT-CTA COMPLETE THORACOABDOMINAL AORTA   Final Result      1.  No aortic aneurysm or dissection identified.   2.  No acute abnormality of the chest, abdomen or pelvis.   3.  Status post cholecystectomy.   4.  Evidence of previous left varicocele embolization.                        CT-HEAD W/O   Final Result      No CT evidence of acute infarct, hemorrhage or mass.         [id number]   Final Result      1.  No evidence of testicular mass or torsion.      2.  Small right inguinal hernia.      DX-CHEST-PORTABLE (1 VIEW)   Final Result      No evidence of acute cardiopulmonary process.        I personally reviewed patient's EKG shows sinus tachycardia with a rate of 110, there is ST depression in leads II, III and aVF.  QTc is 459.    Assessment/Plan:  Justification for Admission Status  I anticipate this patient is appropriate for observation status at this time because likely discharge after overnight    Patient will need a  bed on telemetry/cardiology service.    * Pain in the chest-  (present on admission)  Assessment & Plan  EKG shows sinus tachycardia with a rate of 110, there is ST depression in leads II, III and aVF.  QTc is 459.  Initial troponin is within normal limits, I will trend  Stat EKG and troponin for recurrence of chest pain.   Continuous cardiac monitoring.   Concern for potential pericarditis, I will check viral respiratory panel/coxsackie    Headache- (present on admission)  Assessment & Plan  I will check CT head  I will check viral respiratory panel/coxsackie    Shortness of breath- (present on admission)  Assessment & Plan  CT angiography of the chest did not show evidence for pulmonary embolism  EKG shows sinus tachycardia with a rate of 110, there is ST depression in leads II, III and aVF.  QTc is 459.  Initial troponin is within normal limits, I will trend  Stat EKG and troponin for recurrence of chest pain.   Continuous cardiac monitoring.   Concern for potential pericarditis, I will check viral respiratory panel/coxsackie  Oxygen as needed, Respiratory protocol, Bronchodilators, Incentive spirometry     Testicular pain- (present on admission)  Assessment & Plan  I will check scrotal ultrasound    Hypokalemia- (present on admission)  Assessment & Plan  Replacing, checking magnesium    Continue to monitor replace as needed     VTE prophylaxis: SCDs/TEDs and Xarelto 10 mg daily as prophylaxis

## 2023-07-22 NOTE — DIETARY
"Nutrition services: Day 0 of admit.  Amilcar Grove Jr. is a 28 y.o. male with admitting DX of Chest pain [R07.9]    Consult received for MST 3: wt loss 14-23 lb wt loss x >1 yr, poor PO intake PTA. RD visited pt at bedside; pt's friend at bedside as well. Pt reports weighed 200 lb ~1 yr ago, wt fluctuates quite a bit, down to ~180 lb (10% wt loss x 12 months, not significant). He reports PO fluctuates w/ symptoms of depression--friend observes that pt eats less when depressed. Pt states sedentary job; does not get hungry. RD recommended setting recurring alarms for meals/snacks on his phone, meal-prepping meals and snacks to bring to work. Pt reports normally snacks throughout day and eats most in the evening. Pt reports gallbladder out 11 yrs ago and has experienced GI symptoms such as diarrhea since then. RD made recommendations for optimizing PO intake, including 5-6 smaller meals consistently throughout the day and a moderate-fat diet (<30% of kcals). Pt agreeable to protein smoothies between meals during remainder of admit.    Assessment:  Height: 185.4 cm (6' 1\")  Weight: 82.6 kg (182 lb 1.6 oz)  Body mass index is 24.03 kg/m²., BMI classification: Normal  Diet/Intake: Regular: PO intake not yet documented    Evaluation:   PMHx includes: biliary dyskinesia, laparascopic cholecystectomy. Admit w/ c/o of chest pain, worsening generalized weakness, and shortness of breath; feeling unwell x 2 weeks.  Labs reviewed, chem panel WNL.  MAR: pericolace  Skin: no documented wounds or edema  GI: last BM PTA  No recent chart wt hx available for review.    Malnutrition Risk: Unable to meet 2 ASPEN criteria at this time; wt loss 10% x 1 year as reported does not meet criteria.    Recommendations/Plan:  Discharge orders and summary in; pt to discharge home.  Recommendations for optimization of pt's PO intake at home discussed w/ pt noted above.  "

## 2023-07-22 NOTE — CARE PLAN
The patient is Stable - Low risk of patient condition declining or worsening    Shift Goals  Clinical Goals: Respiratory Panel, Echo  Patient Goals: comfort and discharge  Family Goals: ALLY    Progress made toward(s) clinical / shift goals:        Problem: Pain - Standard  Goal: Alleviation of pain or a reduction in pain to the patient’s comfort goal  Outcome: Progressing     Problem: Knowledge Deficit - Standard  Goal: Patient and family/care givers will demonstrate understanding of plan of care, disease process/condition, diagnostic tests and medications  Outcome: Progressing

## 2023-07-22 NOTE — ASSESSMENT & PLAN NOTE
CT angiography of the chest did not show evidence for pulmonary embolism  EKG shows sinus tachycardia with a rate of 110, there is ST depression in leads II, III and aVF.  QTc is 459.  Initial troponin is within normal limits, I will trend  Stat EKG and troponin for recurrence of chest pain.   Continuous cardiac monitoring.   Concern for potential pericarditis, I will check viral respiratory panel/coxsackie  Oxygen as needed, Respiratory protocol, Bronchodilators, Incentive spirometry

## 2023-07-22 NOTE — CARE PLAN
The patient is Stable - Low risk of patient condition declining or worsening         Progress made toward(s) clinical / shift goals:    Problem: Pain - Standard  Goal: Alleviation of pain or a reduction in pain to the patient’s comfort goal  Description: Target End Date:  Prior to discharge or change in level of care    Document on Vitals flowsheet    1.  Document pain using the appropriate pain scale per order or unit policy  2.  Educate and implement non-pharmacologic comfort measures (i.e. relaxation, distraction, massage, cold/heat therapy, etc.)  3.  Pain management medications as ordered  4.  Reassess pain after pain med administration per policy  5.  If opiods administered assess patient's response to pain medication is appropriate per POSS sedation scale  6.  Follow pain management plan developed in collaboration with patient and interdisciplinary team (including palliative care or pain specialists if applicable)  Outcome: Not Progressing  Note: Pt reports pain relief with current medications       Patient is not progressing towards the following goals:      Problem: Pain - Standard  Goal: Alleviation of pain or a reduction in pain to the patient’s comfort goal  Description: Target End Date:  Prior to discharge or change in level of care    Document on Vitals flowsheet    1.  Document pain using the appropriate pain scale per order or unit policy  2.  Educate and implement non-pharmacologic comfort measures (i.e. relaxation, distraction, massage, cold/heat therapy, etc.)  3.  Pain management medications as ordered  4.  Reassess pain after pain med administration per policy  5.  If opiods administered assess patient's response to pain medication is appropriate per POSS sedation scale  6.  Follow pain management plan developed in collaboration with patient and interdisciplinary team (including palliative care or pain specialists if applicable)  Outcome: Not Progressing  Note: Pt reports pain relief with  current medications

## 2023-07-22 NOTE — DISCHARGE SUMMARY
"Discharge Summary    CHIEF COMPLAINT ON ADMISSION  Chief Complaint   Patient presents with    Chest Pain     Arrives anxious, hyperventilating and complaining of chest pain. With a friend who states this pt had a recent DX of a hernia. Taken for an EKG in triage.        Reason for Admission  Chest Pain, Shotness of Breathe     Admission Date  7/21/2023    CODE STATUS  Full Code    HPI & HOSPITAL COURSE  Per notes, \"28 y.o. male who presented 7/21/2023 with worsening generalized weakness chest pain and shortness of breath.  Patient reports that he has not been feeling well over the past 2 weeks.  He has been having intermittent episodes of shortness of breath, palpitation, generalized weakness.  In addition he has been having some testicular pain and headache. \"    Patient was initially admitted to monitored overnight for chest pain shortness of breath.  Patient symptoms have resolved but the following morning.  Initially there was some concern for viral process such as pericarditis.  However initial EKG not consistent with pericarditis.  Case was discussed with cardiology on the day of discharge, who agreed findings not consistent with pericarditis.  Viral respiratory panel negative, pending coxsackie.  Patient also received an echocardiogram which was negative for acute findings.    Therefore, he is discharged in good and stable condition to home with close outpatient follow-up.    The patient recovered much more quickly than anticipated on admission.    Discharge Date  7/22/2023    FOLLOW UP ITEMS POST DISCHARGE  Patient to follow-up with PCP in the outpatient setting for further work-up if symptoms recur    DISCHARGE DIAGNOSES  Principal Problem:    Pain in the chest (POA: Yes)  Active Problems:    Hypokalemia (POA: Yes)    Testicular pain (POA: Yes)    Shortness of breath (POA: Yes)    Headache (POA: Yes)  Resolved Problems:    * No resolved hospital problems. *      FOLLOW UP  No future appointments.  No " follow-up provider specified.    MEDICATIONS ON DISCHARGE     Medication List        CONTINUE taking these medications        Instructions   FISH OIL PO   Take 1 Capful by mouth every morning.  Dose: 1 Capful     Vitamin D-3 125 MCG (5000 UT) Tabs   Take 5,000 Units by mouth every morning.  Dose: 5,000 Units            STOP taking these medications      ASHWAGANDHA PO     GINKGO PO     Non Formulary Request     VITAMIN K2 PO              Allergies  No Known Allergies    DIET  Orders Placed This Encounter   Procedures    Diet Order Diet: Regular     Standing Status:   Standing     Number of Occurrences:   1     Order Specific Question:   Diet:     Answer:   Regular [1]       ACTIVITY  As tolerated.  Weight bearing as tolerated    CONSULTATIONS  Cardiology    PROCEDURES  None    LABORATORY  Lab Results   Component Value Date    SODIUM 141 07/22/2023    POTASSIUM 4.1 07/22/2023    CHLORIDE 107 07/22/2023    CO2 24 07/22/2023    GLUCOSE 86 07/22/2023    BUN 9 07/22/2023    CREATININE 0.93 07/22/2023        Lab Results   Component Value Date    WBC 6.5 07/22/2023    HEMOGLOBIN 14.2 07/22/2023    HEMATOCRIT 40.7 (L) 07/22/2023    PLATELETCT 204 07/22/2023        Total time of the discharge process exceeds 38 minutes.

## 2023-07-22 NOTE — ASSESSMENT & PLAN NOTE
EKG shows sinus tachycardia with a rate of 110, there is ST depression in leads II, III and aVF.  QTc is 459.  Initial troponin is within normal limits, I will trend  Stat EKG and troponin for recurrence of chest pain.   Continuous cardiac monitoring.   Concern for potential pericarditis, I will check viral respiratory panel/coxsackie

## 2023-08-01 LAB
CV B1 NAB TITR SER NT: NORMAL {TITER}
CV B2 NAB TITR SER NT: NORMAL {TITER}
CV B3 NAB TITR SER NT: NORMAL {TITER}
CV B4 NAB TITR SER NT: NORMAL {TITER}
CV B5 NAB TITR SER NT: NORMAL {TITER}
CV B6 NAB TITR SER NT: NORMAL {TITER}

## 2023-08-08 ENCOUNTER — TELEPHONE (OUTPATIENT)
Dept: HEALTH INFORMATION MANAGEMENT | Facility: OTHER | Age: 28
End: 2023-08-08
Payer: COMMERCIAL

## 2025-04-23 ENCOUNTER — OFFICE VISIT (OUTPATIENT)
Dept: URGENT CARE | Facility: CLINIC | Age: 30
End: 2025-04-23

## 2025-04-23 ENCOUNTER — HOSPITAL ENCOUNTER (OUTPATIENT)
Facility: MEDICAL CENTER | Age: 30
End: 2025-04-23

## 2025-04-23 VITALS
DIASTOLIC BLOOD PRESSURE: 76 MMHG | OXYGEN SATURATION: 97 % | RESPIRATION RATE: 17 BRPM | TEMPERATURE: 98.3 F | HEIGHT: 73 IN | WEIGHT: 185 LBS | HEART RATE: 84 BPM | BODY MASS INDEX: 24.52 KG/M2 | SYSTOLIC BLOOD PRESSURE: 104 MMHG

## 2025-04-23 DIAGNOSIS — Z11.3 SCREEN FOR STD (SEXUALLY TRANSMITTED DISEASE): ICD-10-CM

## 2025-04-23 LAB
C TRACH DNA SPEC QL NAA+PROBE: NEGATIVE
N GONORRHOEA DNA SPEC QL NAA+PROBE: NEGATIVE
SPECIMEN SOURCE: NORMAL

## 2025-04-23 PROCEDURE — 87591 N.GONORRHOEAE DNA AMP PROB: CPT

## 2025-04-23 PROCEDURE — 87491 CHLMYD TRACH DNA AMP PROBE: CPT

## 2025-04-23 PROCEDURE — 3074F SYST BP LT 130 MM HG: CPT

## 2025-04-23 PROCEDURE — 3078F DIAST BP <80 MM HG: CPT

## 2025-04-23 PROCEDURE — 99204 OFFICE O/P NEW MOD 45 MIN: CPT

## 2025-04-23 ASSESSMENT — FIBROSIS 4 INDEX: FIB4 SCORE: 0.52

## 2025-04-23 ASSESSMENT — ENCOUNTER SYMPTOMS
FEVER: 0
CHILLS: 0

## 2025-04-23 NOTE — PROGRESS NOTES
CHIEF COMPLAINT  Chief Complaint   Patient presents with    Other     Requesting std testing, had white spots in mouth/canker sores, x2 days     Subjective:   Amilcar Grove Jr. is a 29 y.o. male who presents to urgent care requesting STD testing.  Patient reports that he had sexual encounter approximately 2 days ago.  Reports that he began experiencing white spots and canker sores, as well as testicular pain.  Patient reports a chronic history of testicular pain secondary to prostate issues.  He reports that the pain is mild and feels similar to previous testicular pain he has had in the past.  He denies any swelling.  No abnormal discharge.  No dysuria.  No fevers or chills.  He denies any known exposure to STDs.      Review of Systems   Constitutional:  Negative for chills and fever.   Genitourinary:  Negative for dysuria, frequency and hematuria.       PAST MEDICAL HISTORY  Patient Active Problem List    Diagnosis Date Noted    Chest pain 2023    Hypokalemia 2023    Testicular pain 2023    Shortness of breath 2023    Pain in the chest 2023    Headache 2023    Biliary dyskinesia 2012       SURGICAL HISTORY   has a past surgical history that includes tonsillectomy (2011) and connie by laparoscopy (2012).    ALLERGIES  No Known Allergies    CURRENT MEDICATIONS  Home Medications       Reviewed by Aftab Goins'demetrio (Medical Assistant) on 25 at 0937  Med List Status: <None>     Medication Last Dose Status   Cholecalciferol (VITAMIN D-3) 125 MCG (5000 UT) Tab Taking Active   Omega-3 Fatty Acids (FISH OIL PO) Taking Active                    SOCIAL HISTORY  Social History     Tobacco Use    Smoking status: Former     Current packs/day: 0.00     Average packs/day: 0.5 packs/day for 11.0 years (5.5 ttl pk-yrs)     Types: Cigarettes     Start date: 2010     Quit date: 2021     Years since quittin.3    Smokeless tobacco: Never  "  Vaping Use    Vaping status: Every Day    Substances: Nicotine    Devices: Disposable, Pre-filled pod   Substance and Sexual Activity    Alcohol use: Yes     Alcohol/week: 1.2 oz     Types: 2 Cans of beer per week     Comment: regularly    Drug use: Not Currently     Types: Oral, Inhaled     Comment: marijuana    Sexual activity: Not on file       FAMILY HISTORY  No family history on file.      Medications, Allergies, and current problem list reviewed today in Epic.     Objective:     /76   Pulse 84   Temp 36.8 °C (98.3 °F) (Temporal)   Resp 17   Ht 1.854 m (6' 1\")   Wt 83.9 kg (185 lb)   SpO2 97%     Physical Exam  Vitals reviewed.   Constitutional:       General: He is not in acute distress.     Appearance: Normal appearance. He is not ill-appearing or toxic-appearing.   HENT:      Head: Normocephalic.   Cardiovascular:      Pulses: Normal pulses.   Pulmonary:      Effort: Pulmonary effort is normal.   Genitourinary:     Penis: Normal and circumcised.       Testes: Normal. Cremasteric reflex is present.         Right: Mass, tenderness or swelling not present.         Left: Mass, tenderness or swelling not present.   Skin:     General: Skin is warm.   Neurological:      General: No focal deficit present.      Mental Status: He is alert.   Psychiatric:         Mood and Affect: Mood normal.         Assessment/Plan:     Diagnosis and associated orders:     1. Screen for STD (sexually transmitted disease)  Chlamydia/GC, PCR (Urine)         Comments/MDM:     Patient presents urgent care with mild testicular pain that is similar to previous testicular pain he has had in the past secondary to prostate issues.  He denies any other STI symptoms.  No testicular swelling.  He is concerned as he did have a sexual encounter 2 days ago that was unprotected.  Requesting STD testing.  Patient advised on potential false negative given sexual encounter was 2 days ago.  He would like to continue with chlamydia and " gonorrhea testing.  Patient declines HIV testing and syphilis testing.  Patient politely declined testicular ultrasound and is opting for only testing Chlamydia/gonorrhea at this time.  Discussed [facility] STI clinic as they do offer services on a sliding scale.  Information provided to patient prior to discharge.  Return to clinic as needed.         Differential diagnosis, natural history, supportive care, and indications for immediate follow-up discussed.    Advised the patient to follow-up with the primary care physician for recheck, reevaluation, and consideration of further management.    Please note that this dictation was created using voice recognition software. I have made a reasonable attempt to correct obvious errors, but I expect that there are errors of grammar and possibly content that I did not discover before finalizing the note.    This note was electronically signed by DAVE Munson.

## 2025-04-25 ENCOUNTER — RESULTS FOLLOW-UP (OUTPATIENT)
Dept: URGENT CARE | Facility: CLINIC | Age: 30
End: 2025-04-25

## 2025-04-29 ENCOUNTER — TELEPHONE (OUTPATIENT)
Dept: URGENT CARE | Facility: CLINIC | Age: 30
End: 2025-04-29

## 2025-04-29 NOTE — TELEPHONE ENCOUNTER
I called pt but his phone went straight to voicemail. I was unable to leave a voicemail due to it being full. If pt calls back, please transfer to me. Thank you.